# Patient Record
Sex: FEMALE
[De-identification: names, ages, dates, MRNs, and addresses within clinical notes are randomized per-mention and may not be internally consistent; named-entity substitution may affect disease eponyms.]

---

## 2018-07-17 ENCOUNTER — HOSPITAL ENCOUNTER (EMERGENCY)
Dept: HOSPITAL 25 - UCEAST | Age: 38
Discharge: HOME | End: 2018-07-17
Payer: COMMERCIAL

## 2018-07-17 VITALS — DIASTOLIC BLOOD PRESSURE: 72 MMHG | SYSTOLIC BLOOD PRESSURE: 113 MMHG
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
## 2018-07-17 DIAGNOSIS — S81.851A: Primary | ICD-10-CM

## 2018-07-17 DIAGNOSIS — L03.115: ICD-10-CM
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
## 2018-07-17 DIAGNOSIS — F17.210: ICD-10-CM

## 2018-07-17 PROCEDURE — 96372 THER/PROPH/DIAG INJ SC/IM: CPT

## 2018-07-17 PROCEDURE — G0463 HOSPITAL OUTPT CLINIC VISIT: HCPCS

## 2018-07-17 PROCEDURE — 90715 TDAP VACCINE 7 YRS/> IM: CPT

## 2018-07-17 PROCEDURE — 90471 IMMUNIZATION ADMIN: CPT

## 2018-07-17 PROCEDURE — 99202 OFFICE O/P NEW SF 15 MIN: CPT

## 2018-07-17 NOTE — UC
General HPI





- HPI Summary


HPI Summary: 





Bitten by housecat (hers) yesterday.  Bite was "provoked," in other words, the 

lea has bitten in the past when upset.  Treated with rubbing alcohol and 

neosporin.  Last night c/o worse pain, and this am noted swelling and redness.  





Ms. Navarro is certain that teeth did not break. 


LAst tet approx 5 and 1/2 yrs ago.  





Rabies vaccines utd per pt.








- History of Current Complaint


Chief Complaint: UCSkin


Stated Complaint: CAT BITE


Time Seen by Provider: 07/17/18 09:43


Hx Obtained From: Patient


Hx Last Menstrual Period: June 25, 2018


Pain Intensity: 7





- Allergy/Home Medications


Allergies/Adverse Reactions: 


 Allergies











Allergy/AdvReac Type Severity Reaction Status Date / Time


 


No Known Allergies Allergy   Verified 07/03/14 12:25














PMH/Surg Hx/FS Hx/Imm Hx


Previously Healthy: Yes





- Surgical History


Surgical History: Yes


Surgery Procedure, Year, and Place: gall bladder removal





- Family History


Known Family History: Positive: Unknown





- Social History


Occupation: Employed Full-time


Alcohol Use: Daily


Substance Use Type: Marijuana


Smoking Status (MU): Current Every Day Smoker


Amount Used/How Often: 4-6 cigaretts a day





Review of Systems


Constitutional: Negative


Skin: Other - see hpi


Eyes: Negative


ENT: Negative


Respiratory: Negative


Cardiovascular: Negative


Gastrointestinal: Negative


Genitourinary: Negative


Motor: Other - see hpi


Neurovascular: Negative


Musculoskeletal: Other: - see hpi


Neurological: Negative


Psychological: Negative


Is Patient Immunocompromised?: No


All Other Systems Reviewed And Are Negative: Yes





Physical Exam


Triage Information Reviewed: Yes


Appearance: Well-Appearing, Well-Nourished


Vital Signs: 


 Initial Vital Signs











Temp  98.2 F   07/17/18 09:26


 


Pulse  81   07/17/18 09:26


 


Resp  16   07/17/18 09:26


 


BP  113/72   07/17/18 09:26


 


Pulse Ox  98   07/17/18 09:26











Vital Signs Reviewed: Yes


Eye Exam: Normal


ENT Exam: Normal


Neck exam: Normal


Respiratory Exam: Normal - no tachypnea, no dyspnea


Cardiovascular Exam: Normal - nondiaphoretic, heart rate regular


Abdominal Exam: Normal - no c/o's


Musculoskeletal Exam: Other - Moves x 4 ext's, gait steady.  RLE ant lat tib 

area with + bite site.  There is approx 0.75cm diam necrotic tissue at site of 

bite.   Approx 11.5cm x 8cm irreg redness, slight warm to touch.  No crepitus.  

Foot warm to touch. Dp/pt present.  Distal sens LT present with good CR.  BLE + 

early evidence venous insuff changes, and varicosities.  D/w pt





Course/Dx





- Course


Course Of Treatment: Declines xray RLE.  Ceftriaxone 500mg IM x 1 here.  

Augmentin started.  Reviewed f/u and wound care instructions.  Questions as 

posed answered to the best of my ability.  Health Dept notified by RN.





- Differential Dx - Multi-Symptom


Provider Diagnoses: Cat bite.  Cellulitis





Discharge





- Sign-Out/Discharge


Documenting (check all that apply): Patient Departure





- Discharge Plan


Condition: Critical


Disposition: HOME


Prescriptions: 


Amoxicillin/Clavulanate TAB* [Augmentin *] 875 mg PO BID #20 tab


Fluconazole [Diflucan 150 MG (NF)] 150 mg PO DAILY #2 tab


Mupirocin 2% OINT* [Bactroban 2 % Oint*] 1 applic TOPICAL BID #1 tube


Patient Education Materials:  Diphtheria/Acellular Pertussis/Tetanus Booster 

Vaccine (By injection), Animal Bite (ED)


Forms:  *Work Release


Referrals: 


Kat Grant MD [Primary Care Provider] - 


Additional Instructions: 


Follow up with your primary care physician in 24 - 48 hours.  


If you can not see your primary care physician, then consider following here.  


Please seek medical attention (Emergency Department) for worse or new problems 

in the meantime.





You received rocephin 500mg IM today .


Start augmentin today. 





Avoid astringents (ex no rubbing alcohol, hydrogen peroxide, etc). 


Avoid soaking, although shower ok, pat dry, avoid direct scrub. 





Elevate your leg as much as possible.  





Compression sock / stocking if possible (mild to medium compression).  





 





- Billing Disposition and Condition


Condition: CRITICAL


Disposition: Home